# Patient Record
Sex: FEMALE | Race: WHITE | Employment: OTHER | URBAN - METROPOLITAN AREA
[De-identification: names, ages, dates, MRNs, and addresses within clinical notes are randomized per-mention and may not be internally consistent; named-entity substitution may affect disease eponyms.]

---

## 2024-05-29 ENCOUNTER — OFFICE VISIT (OUTPATIENT)
Age: 79
End: 2024-05-29

## 2024-05-29 ENCOUNTER — HOSPITAL ENCOUNTER (EMERGENCY)
Facility: HOSPITAL | Age: 79
Discharge: HOME | End: 2024-05-29
Attending: EMERGENCY MEDICINE
Payer: MEDICARE

## 2024-05-29 ENCOUNTER — APPOINTMENT (OUTPATIENT)
Facility: HOSPITAL | Age: 79
End: 2024-05-29
Payer: MEDICARE

## 2024-05-29 VITALS
HEART RATE: 80 BPM | RESPIRATION RATE: 16 BRPM | TEMPERATURE: 98.3 F | DIASTOLIC BLOOD PRESSURE: 86 MMHG | OXYGEN SATURATION: 99 % | SYSTOLIC BLOOD PRESSURE: 186 MMHG

## 2024-05-29 VITALS
RESPIRATION RATE: 20 BRPM | DIASTOLIC BLOOD PRESSURE: 82 MMHG | HEIGHT: 60 IN | OXYGEN SATURATION: 97 % | TEMPERATURE: 98.1 F | BODY MASS INDEX: 26.11 KG/M2 | WEIGHT: 133 LBS | SYSTOLIC BLOOD PRESSURE: 165 MMHG | HEART RATE: 80 BPM

## 2024-05-29 DIAGNOSIS — S61.401A AVULSION OF SKIN OF HAND, RIGHT, INITIAL ENCOUNTER: Primary | ICD-10-CM

## 2024-05-29 DIAGNOSIS — S63.269A CLOSED DISLOCATION OF METACARPOPHALANGEAL JOINT OF FINGER, INITIAL ENCOUNTER: ICD-10-CM
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
## 2024-05-29 PROCEDURE — 99283 EMERGENCY DEPT VISIT LOW MDM: CPT

## 2024-05-29 PROCEDURE — 73130 X-RAY EXAM OF HAND: CPT

## 2024-05-29 RX ORDER — LIDOCAINE HYDROCHLORIDE 10 MG/ML
5 INJECTION, SOLUTION EPIDURAL; INFILTRATION; INTRACAUDAL; PERINEURAL ONCE
Status: DISCONTINUED | OUTPATIENT
Start: 2024-05-29 | End: 2024-05-29 | Stop reason: HOSPADM

## 2024-05-29 ASSESSMENT — PAIN DESCRIPTION - ORIENTATION: ORIENTATION: RIGHT

## 2024-05-29 ASSESSMENT — PAIN - FUNCTIONAL ASSESSMENT: PAIN_FUNCTIONAL_ASSESSMENT: ACTIVITIES ARE NOT PREVENTED

## 2024-05-29 ASSESSMENT — PAIN DESCRIPTION - PAIN TYPE: TYPE: ACUTE PAIN

## 2024-05-29 ASSESSMENT — PAIN DESCRIPTION - LOCATION: LOCATION: ARM

## 2024-05-29 ASSESSMENT — PAIN SCALES - GENERAL: PAINLEVEL_OUTOF10: 6

## 2024-05-29 ASSESSMENT — PAIN DESCRIPTION - FREQUENCY: FREQUENCY: CONTINUOUS

## 2024-05-29 NOTE — ED PROVIDER NOTES
[facility] EMERGENCY DEPT  EMERGENCY DEPARTMENT ENCOUNTER      Pt Name: Natalee Griggs  MRN: [id number]  Birthdate 1945  Date of evaluation: 5/29/2024  Provider: Ignacio Morales MD      HISTORY OF PRESENT ILLNESS      79-year-old female without any pertinent medical history presents to the emergency department with injury to her right hand.  She was walking her dog and tripped and fell with skin tear to the right hand.  See urgent care with referral to ED for further evaluation.  Complains of pain in the right pinky as well    The history is provided by the patient and medical records.           Nursing Notes were reviewed.    REVIEW OF SYSTEMS         Review of Systems        PAST MEDICAL HISTORY   No past medical history on file.      SURGICAL HISTORY     No past surgical history on file.      CURRENT MEDICATIONS       Previous Medications    ATORVASTATIN (LIPITOR) 10 MG TABLET    Take 1 tablet by mouth daily (with breakfast)    LEVOTHYROXINE (SYNTHROID) 75 MCG TABLET    Take 1 tablet by mouth every morning (before breakfast)    OXYCODONE 5 MG CAPSULE    Take 5-10 mg by mouth every 4 hours as needed.    WARFARIN (COUMADIN) 2 MG TABLET    Take by mouth       ALLERGIES     Patient has no known allergies.    FAMILY HISTORY     No family history on file.       SOCIAL HISTORY       Social History     Socioeconomic History    Marital status:    Tobacco Use    Smoking status: Never    Smokeless tobacco: Never   Vaping Use    Vaping Use: Never used   Substance and Sexual Activity    Alcohol use: Yes     Comment: 1 drink nightly    Drug use: Never         PHYSICAL EXAM       ED Triage Vitals [05/29/24 1830]   BP Temp Temp Source Pulse Respirations SpO2 Height Weight   (!) 186/86 98.3 °F (36.8 °C) Oral 80 16 99 % -- --       There is no height or weight on file to calculate BMI.    Physical Exam  Vitals and nursing note reviewed.   Constitutional:       Appearance: Normal appearance.   Cardiovascular:

## 2024-05-29 NOTE — PROGRESS NOTES
Natalee Griggs (:  1945) is a 79 y.o. female,New patient, here for evaluation of the following chief complaint(s):  Hand Injury (Walking dog and tripped R hand abrasion. )        SUBJECTIVE/OBJECTIVE:    History provided by:  Patient  Hand Injury          79 y.o. female presents with symptoms of right hand abrasion/skin avulsion that happened while walking her dog, she tripped and fell and caught her hand and the skin was torn. Some tenderness over pinky finger and lateral hand. States she is almost positive a tetanus was done in the last 5 years, was done at [address]. Chart lists warfarin as a medication but patient denies taking it.         Vitals:    24 1719   BP: (!) 165/82   Site: Left Upper Arm   Position: Sitting   Cuff Size: Medium Adult   Pulse: 80   Resp: 20   Temp: 98.1 °F (36.7 °C)   TempSrc: Oral   SpO2: 97%   Weight: 60.3 kg (133 lb)   Height: 1.524 m (5')       No results found for this visit on 24.     Physical Exam  Constitutional:       General: She is not in acute distress.     Appearance: Normal appearance. She is not ill-appearing or toxic-appearing.   HENT:      Head: Normocephalic and atraumatic.   Pulmonary:      Effort: Pulmonary effort is normal. No respiratory distress.   Skin:     Comments: Large skin avulsion right dorsal hand medial aspect overlying first-third metacarpals about 4 x 4 cm. Some blood in the wound but not actively bleeding.   Neurological:      General: No focal deficit present.      Mental Status: She is oriented to person, place, and time.   Psychiatric:         Mood and Affect: Mood normal.         Behavior: Behavior normal.         Thought Content: Thought content normal.         Judgment: Judgment normal.          ASSESSMENT/PLAN:  1. Avulsion of skin of hand, right, initial encounter      Sending patient to emergency room due to severity of wound  Brother-in-law taking patient to [place] emergency room  Patient comfortable with

## 2024-05-29 NOTE — PATIENT INSTRUCTIONS
Sending patient to emergency due to severity of wound. Patient on her way to [place] emergency [employer] (brother-in-law driving her).

## 2024-05-29 NOTE — ED TRIAGE NOTES
Arrives ambulatory as a referral from urgent care. Pt reports she had a witnessed glf about 2 hrs ago. Reports she was walking her dog and tripped over her dog. States her face hit the grass. Denies LOC. Went to urgent care and had skin tear cleaned but referred to ED for further wound care tx.     Denies use of blood thinner